# Patient Record
Sex: FEMALE
[De-identification: names, ages, dates, MRNs, and addresses within clinical notes are randomized per-mention and may not be internally consistent; named-entity substitution may affect disease eponyms.]

---

## 2019-08-23 ENCOUNTER — HOSPITAL ENCOUNTER (OUTPATIENT)
Dept: HOSPITAL 5 - CATHLABREC | Age: 64
Setting detail: OBSERVATION
LOS: 1 days | Discharge: HOME | End: 2019-08-24
Attending: INTERNAL MEDICINE | Admitting: INTERNAL MEDICINE
Payer: COMMERCIAL

## 2019-08-23 DIAGNOSIS — I25.10: Primary | ICD-10-CM

## 2019-08-23 LAB
APTT BLD: 27.9 SEC. (ref 24.2–36.6)
BASOPHILS # (AUTO): 0.1 K/MM3 (ref 0–0.1)
BASOPHILS NFR BLD AUTO: 0.7 % (ref 0–1.8)
BUN SERPL-MCNC: 19 MG/DL (ref 7–17)
BUN/CREAT SERPL: 27 %
CALCIUM SERPL-MCNC: 9.2 MG/DL (ref 8.4–10.2)
EOSINOPHIL # BLD AUTO: 0.2 K/MM3 (ref 0–0.4)
EOSINOPHIL NFR BLD AUTO: 1.5 % (ref 0–4.3)
HCT VFR BLD CALC: 38.4 % (ref 30.3–42.9)
HEMOLYSIS INDEX: 69
HGB BLD-MCNC: 12.9 GM/DL (ref 10.1–14.3)
INR PPP: 1.1 (ref 0.87–1.13)
LYMPHOCYTES # BLD AUTO: 2.7 K/MM3 (ref 1.2–5.4)
LYMPHOCYTES NFR BLD AUTO: 23.6 % (ref 13.4–35)
MCHC RBC AUTO-ENTMCNC: 34 % (ref 30–34)
MCV RBC AUTO: 91 FL (ref 79–97)
MONOCYTES # (AUTO): 0.8 K/MM3 (ref 0–0.8)
MONOCYTES % (AUTO): 6.8 % (ref 0–7.3)
PLATELET # BLD: 257 K/MM3 (ref 140–440)
RBC # BLD AUTO: 4.2 M/MM3 (ref 3.65–5.03)

## 2019-08-23 PROCEDURE — 92928 PRQ TCAT PLMT NTRAC ST 1 LES: CPT

## 2019-08-23 PROCEDURE — 93458 L HRT ARTERY/VENTRICLE ANGIO: CPT

## 2019-08-23 PROCEDURE — 82553 CREATINE MB FRACTION: CPT

## 2019-08-23 PROCEDURE — C1894 INTRO/SHEATH, NON-LASER: HCPCS

## 2019-08-23 PROCEDURE — 85730 THROMBOPLASTIN TIME PARTIAL: CPT

## 2019-08-23 PROCEDURE — 36415 COLL VENOUS BLD VENIPUNCTURE: CPT

## 2019-08-23 PROCEDURE — 93010 ELECTROCARDIOGRAM REPORT: CPT

## 2019-08-23 PROCEDURE — C1887 CATHETER, GUIDING: HCPCS

## 2019-08-23 PROCEDURE — 92978 ENDOLUMINL IVUS OCT C 1ST: CPT

## 2019-08-23 PROCEDURE — 80048 BASIC METABOLIC PNL TOTAL CA: CPT

## 2019-08-23 PROCEDURE — C1769 GUIDE WIRE: HCPCS

## 2019-08-23 PROCEDURE — 82550 ASSAY OF CK (CPK): CPT

## 2019-08-23 PROCEDURE — 82962 GLUCOSE BLOOD TEST: CPT

## 2019-08-23 PROCEDURE — 85610 PROTHROMBIN TIME: CPT

## 2019-08-23 PROCEDURE — 71045 X-RAY EXAM CHEST 1 VIEW: CPT

## 2019-08-23 PROCEDURE — 85025 COMPLETE CBC W/AUTO DIFF WBC: CPT

## 2019-08-23 PROCEDURE — 85347 COAGULATION TIME ACTIVATED: CPT

## 2019-08-23 PROCEDURE — 80061 LIPID PANEL: CPT

## 2019-08-23 PROCEDURE — C1753 CATH, INTRAVAS ULTRASOUND: HCPCS

## 2019-08-23 PROCEDURE — C9600 PERC DRUG-EL COR STENT SING: HCPCS

## 2019-08-23 PROCEDURE — C1874 STENT, COATED/COV W/DEL SYS: HCPCS

## 2019-08-23 PROCEDURE — G0378 HOSPITAL OBSERVATION PER HR: HCPCS

## 2019-08-23 PROCEDURE — 84484 ASSAY OF TROPONIN QUANT: CPT

## 2019-08-23 PROCEDURE — 93005 ELECTROCARDIOGRAM TRACING: CPT

## 2019-08-23 RX ADMIN — SODIUM CHLORIDE SCH MLS/HR: 0.9 INJECTION, SOLUTION INTRAVENOUS at 07:13

## 2019-08-23 RX ADMIN — SODIUM CHLORIDE SCH MLS: 0.9 INJECTION, SOLUTION INTRAVENOUS at 09:52

## 2019-08-23 RX ADMIN — HEPARIN SODIUM ONE UNIT: 1000 INJECTION, SOLUTION INTRAVENOUS; SUBCUTANEOUS at 10:06

## 2019-08-23 RX ADMIN — HEPARIN SODIUM ONE UNIT: 1000 INJECTION, SOLUTION INTRAVENOUS; SUBCUTANEOUS at 09:52

## 2019-08-23 RX ADMIN — HEPARIN SODIUM ONE UNIT: 1000 INJECTION, SOLUTION INTRAVENOUS; SUBCUTANEOUS at 10:30

## 2019-08-23 NOTE — CARDIAC CATHERIZATION REPORT
REFERRING PHYSICIAN:  Dr. Lux Alcantara.



INDICATION FOR PROCEDURE:  The patient is a pleasant 64-year-old 

female having recurrent chest pain, abnormal nuclear stress test, on multiple

antianginals, referred for left heart catheterization.  She has known peripheral

vascular disease as well.  Risks, benefits, alternatives discussed at length

prior to obtaining informed consent.



PROCEDURE IN DETAIL:  The patient was brought to the cath lab in a

postabsorptive state, was prepped and draped in sterile fashion.  Humphrey's test

in right hand was normal.  A 2 mL of 2% lidocaine used to anesthetize the right

wrist.  A standard 6-[place] hydrophilic sheath used to cannulate the right

radial artery via modified Seldinger technique.  All exchanges performed to

exchange a J-tip guidewire.  A JL3.5 catheter used to engage the left main.  No

dampening or ventricularization.  Cineangiography performed in multiple

projections.  JR4 catheter used to cross the aortic valve on fluoroscopic

guidance.  Left ventriculography in 30 JUNG and 30 KEVIN projections via hand

injections, catheter flushed.  Manual pullback performed with continuous

pressure monitoring.  Catheter used to engage the right coronary.  No dampening

or ventricularization.  Cineangiography performed in all projections.



DATA:  Aortic pressure is 120/80.  LV pressure is 120, LVEDP of 12 mmHg.  She

remained in normal sinus rhythm throughout the procedure.  Left ventriculography

reveals normal systolic performance.  Estimated ejection fraction of 55-60%.  No

evidence of aortic stenosis.



CORONARY ANATOMY:  This is a codominant system.  Left main is a short vessel,

bifurcates into left anterior descending and left circumflex.  Her overall

vasculature is quite small for her size, likely due to long-term tobacco abuse. 

Left circumflex with an ulcerated 90% stenosis in the mid segment.  This is the

culprit lesion.  There are extensive left to right collaterals identified.  LAD

is a moderate sized vessel, courses the anterior interventricular groove, wraps

around the apex, scattered luminal irregularities in the small LAD, but no

obstructive lesions identified in the LAD or diagonal vessels.  The right

coronary is very small and chronically occluded in the mid segment.  Again, left

to right collaterals were identified.



Given unstable angina, abnormal stress test with lateral ischemia, chest pain,

multiple antianginals, decided to proceed with PCI.  Heparin given.  EBU 3.0

guide was used to engage left main without difficulty, used a [place] wire to

cross the lesion, used a 2.5 x 18 Jose Antonio drug-eluting stent deployed at 12 LIEN for

30 seconds, excellent angiographic result.  Intravascular ultrasound was

performed, which reveals a well-opposed, well-expanded stent.  No complications.

 Final angiogram reveals excellent result.  I directly supervised the

administration of moderate sedation from 10:10 a.m.-10:45 a.m. with fentanyl and

Versed.



CONCLUSIONS:

1.  Severe 2-vessel disease.

a.  Ulcerated 90% mid left circumflex stenosis, which is the culprit lesion

consistent with her symptoms and abnormal stress test.

b.  Successful IVUS guided PCI with placement of drug-eluting stent (Jose Antonio 2.5 x

18 mm) with excellent final angiographic and ultrasonographic results.

c.  Chronic total occlusion of a very small mid right coronary with left to

right collaterals.  Medical management.

3.  Short left main and LAD without significant disease.

4.  Preserved left ventricular systolic performance, estimated ejection fraction

of 55-60%.

5.  No evidence of aortic stenosis.



Aspirin, Plavix, statin.  I discussed smoking cessation at length for 5 minutes,

including techniques; aggressive primary and secondary prevention measures;

standard radial care.  Results of procedure explained to the patient at length. 

All questions were addressed.  Anticipate discharge tomorrow.





DD: 08/23/2019 11:35

DT: 08/23/2019 12:35

JOB# [id number]  [id number]

SBM/NTS

## 2019-08-24 VITALS — SYSTOLIC BLOOD PRESSURE: 103 MMHG | DIASTOLIC BLOOD PRESSURE: 47 MMHG

## 2019-08-24 LAB
BASOPHILS # (AUTO): 0 K/MM3 (ref 0–0.1)
BASOPHILS NFR BLD AUTO: 0.4 % (ref 0–1.8)
BUN SERPL-MCNC: 9 MG/DL (ref 7–17)
BUN/CREAT SERPL: 18 %
CALCIUM SERPL-MCNC: 8.9 MG/DL (ref 8.4–10.2)
EOSINOPHIL # BLD AUTO: 0.1 K/MM3 (ref 0–0.4)
EOSINOPHIL NFR BLD AUTO: 1.2 % (ref 0–4.3)
HCT VFR BLD CALC: 38 % (ref 30.3–42.9)
HDLC SERPL-MCNC: 30 MG/DL (ref 40–59)
HEMOLYSIS INDEX: 0
HGB BLD-MCNC: 13.1 GM/DL (ref 10.1–14.3)
LYMPHOCYTES # BLD AUTO: 1.8 K/MM3 (ref 1.2–5.4)
LYMPHOCYTES NFR BLD AUTO: 19.6 % (ref 13.4–35)
MCHC RBC AUTO-ENTMCNC: 34 % (ref 30–34)
MCV RBC AUTO: 90 FL (ref 79–97)
MONOCYTES # (AUTO): 0.7 K/MM3 (ref 0–0.8)
MONOCYTES % (AUTO): 7.5 % (ref 0–7.3)
PLATELET # BLD: 218 K/MM3 (ref 140–440)
RBC # BLD AUTO: 4.2 M/MM3 (ref 3.65–5.03)

## 2019-08-24 NOTE — DISCHARGE SUMMARY
Providers





- Providers


Date of Admission: 


08/23/19 12:02





Attending physician: 


DOE GONSALVES





                                        





08/23/19


Consult to Cardiac Rehabilitation [CONS] Routine 


   Reason For Exam: post pci














Hospitalization


Condition: Good


Disposition: DC-01 TO HOME OR SELFCARE





- Discharge Diagnoses


(1) CAD (coronary artery disease)


Status: Acute   





(2) Stented coronary artery


Status: Acute   





Core Measure Documentation





- Palliative Care


Palliative Care/ Comfort Measures: Not Applicable





- Core Measures


Any of the following diagnoses?: none





- VTE Discharge Requirements


Deep Vein Thrombosis/Pulmonary Embolism Present on Admission: No


Has pt received <5 days of overlap therapy or INR<2.0: No


Contraindication No Overlap Therapy order at DC: Not Indicated





- Heart Failure Discharge Requirements


ACE/ARB for LVSD if EF <40%: Not Applicable





- Stroke Discharge Requirements


Statin for LDL = or >70 mg/dl on DC: Not Applicable





Exam





- Constitutional


Vitals: 


                                        











Temp Pulse Resp BP Pulse Ox


 


 98.3 F   73   18   103/47   98 


 


 08/24/19 07:28  08/24/19 10:00  08/24/19 07:28  08/24/19 07:28  08/24/19 07:28











General appearance: Present: no acute distress





- EENT


Eyes: Present: PERRL


ENT: hearing intact





- Neck


Neck: Present: supple





- Respiratory


Respiratory effort: normal


Respiratory: bilateral: CTA





- Cardiovascular


Rhythm: regular


Heart Sounds: Present: S1 & S2





- Extremities


Extremities: no ischemia, No edema


Extremity abnormal: other (right radial site bruised.  No hematoma or edema. )


Peripheral Pulses: within normal limits





- Abdominal


General gastrointestinal: Present: deferred


Female genitourinary: Present: deferred





Plan


Activity: no restrictions


Weight Bearing Status: Full Weight Bearing


Diet: low fat, low cholesterol, low salt


Wound: open to air


Special Instructions: smoking cessation, other (Do not take metformin on 8/24 

and 8/25.  Resume metformin on 8/26)


Follow up with: 


HOLGER MELENDEZ NP [Other] - 7 Days


ILAN MENON MD [Staff Physician] - 7 Days


Prescriptions: 


Clopidogrel [Plavix] 75 mg PO QDAY 30 Days  tablet

## 2019-08-24 NOTE — XRAY REPORT
CHEST 1 VIEW 8/24/2019 7:57 AM



INDICATION / CLINICAL INFORMATION:

post pci.



COMPARISON: 

None available.



FINDINGS:



SUPPORT DEVICES: None.

HEART / MEDIASTINUM: Normal cardiac size and mediastinal contours with mild aortic atherosclerosis. 

LUNGS / PLEURA: No significant pulmonary or pleural abnormality. No pneumothorax. 



ADDITIONAL FINDINGS: No significant additional findings.



IMPRESSION:

No acute abnormality of the chest.



Signer Name: Erich Lee MD 

Signed: 8/24/2019 8:06 AM

 Workstation Name: [id number]

## 2019-08-26 ENCOUNTER — HOSPITAL ENCOUNTER (INPATIENT)
Dept: HOSPITAL 5 - ED | Age: 64
LOS: 2 days | Discharge: HOME HEALTH | DRG: 65 | End: 2019-08-28
Attending: INTERNAL MEDICINE | Admitting: INTERNAL MEDICINE
Payer: COMMERCIAL

## 2019-08-26 DIAGNOSIS — G20: ICD-10-CM

## 2019-08-26 DIAGNOSIS — R41.4: ICD-10-CM

## 2019-08-26 DIAGNOSIS — I25.10: ICD-10-CM

## 2019-08-26 DIAGNOSIS — E11.9: ICD-10-CM

## 2019-08-26 DIAGNOSIS — I10: ICD-10-CM

## 2019-08-26 DIAGNOSIS — Z79.84: ICD-10-CM

## 2019-08-26 DIAGNOSIS — R29.702: ICD-10-CM

## 2019-08-26 DIAGNOSIS — Z95.5: ICD-10-CM

## 2019-08-26 DIAGNOSIS — G81.94: ICD-10-CM

## 2019-08-26 DIAGNOSIS — F17.200: ICD-10-CM

## 2019-08-26 DIAGNOSIS — R47.81: ICD-10-CM

## 2019-08-26 DIAGNOSIS — R47.1: ICD-10-CM

## 2019-08-26 DIAGNOSIS — J45.909: ICD-10-CM

## 2019-08-26 DIAGNOSIS — I63.9: Primary | ICD-10-CM

## 2019-08-26 DIAGNOSIS — I65.22: ICD-10-CM

## 2019-08-26 LAB
APTT BLD: 21 SEC. (ref 24.2–36.6)
BASOPHILS # (AUTO): 0.1 K/MM3 (ref 0–0.1)
BASOPHILS NFR BLD AUTO: 0.8 % (ref 0–1.8)
BUN SERPL-MCNC: 16 MG/DL (ref 7–17)
BUN/CREAT SERPL: 20 %
CALCIUM SERPL-MCNC: 9 MG/DL (ref 8.4–10.2)
EOSINOPHIL # BLD AUTO: 0.1 K/MM3 (ref 0–0.4)
EOSINOPHIL NFR BLD AUTO: 1.7 % (ref 0–4.3)
HCT VFR BLD CALC: 36.1 % (ref 30.3–42.9)
HEMOLYSIS INDEX: 18
HGB BLD-MCNC: 12.1 GM/DL (ref 10.1–14.3)
INR PPP: 1.12 (ref 0.87–1.13)
LYMPHOCYTES # BLD AUTO: 2.3 K/MM3 (ref 1.2–5.4)
LYMPHOCYTES NFR BLD AUTO: 27.1 % (ref 13.4–35)
MCHC RBC AUTO-ENTMCNC: 34 % (ref 30–34)
MCV RBC AUTO: 91 FL (ref 79–97)
MONOCYTES # (AUTO): 0.6 K/MM3 (ref 0–0.8)
MONOCYTES % (AUTO): 7.6 % (ref 0–7.3)
PLATELET # BLD: 211 K/MM3 (ref 140–440)
RBC # BLD AUTO: 3.95 M/MM3 (ref 3.65–5.03)

## 2019-08-26 PROCEDURE — 70450 CT HEAD/BRAIN W/O DYE: CPT

## 2019-08-26 PROCEDURE — 84484 ASSAY OF TROPONIN QUANT: CPT

## 2019-08-26 PROCEDURE — 85610 PROTHROMBIN TIME: CPT

## 2019-08-26 PROCEDURE — 85730 THROMBOPLASTIN TIME PARTIAL: CPT

## 2019-08-26 PROCEDURE — 70498 CT ANGIOGRAPHY NECK: CPT

## 2019-08-26 PROCEDURE — 93010 ELECTROCARDIOGRAM REPORT: CPT

## 2019-08-26 PROCEDURE — 70544 MR ANGIOGRAPHY HEAD W/O DYE: CPT

## 2019-08-26 PROCEDURE — 70496 CT ANGIOGRAPHY HEAD: CPT

## 2019-08-26 PROCEDURE — 82962 GLUCOSE BLOOD TEST: CPT

## 2019-08-26 PROCEDURE — 93005 ELECTROCARDIOGRAM TRACING: CPT

## 2019-08-26 PROCEDURE — 93306 TTE W/DOPPLER COMPLETE: CPT

## 2019-08-26 PROCEDURE — 80048 BASIC METABOLIC PNL TOTAL CA: CPT

## 2019-08-26 PROCEDURE — 80061 LIPID PANEL: CPT

## 2019-08-26 PROCEDURE — 85025 COMPLETE CBC W/AUTO DIFF WBC: CPT

## 2019-08-26 PROCEDURE — 93880 EXTRACRANIAL BILAT STUDY: CPT

## 2019-08-26 PROCEDURE — 70551 MRI BRAIN STEM W/O DYE: CPT

## 2019-08-26 PROCEDURE — 85670 THROMBIN TIME PLASMA: CPT

## 2019-08-26 PROCEDURE — 83036 HEMOGLOBIN GLYCOSYLATED A1C: CPT

## 2019-08-26 PROCEDURE — 99406 BEHAV CHNG SMOKING 3-10 MIN: CPT

## 2019-08-26 PROCEDURE — 80053 COMPREHEN METABOLIC PANEL: CPT

## 2019-08-26 PROCEDURE — 36415 COLL VENOUS BLD VENIPUNCTURE: CPT

## 2019-08-26 RX ADMIN — GLIPIZIDE SCH MG: 10 TABLET ORAL at 23:48

## 2019-08-26 RX ADMIN — ASPIRIN SCH MG: 81 TABLET, CHEWABLE ORAL at 23:19

## 2019-08-26 RX ADMIN — Medication SCH ML: at 23:19

## 2019-08-26 RX ADMIN — METFORMIN HYDROCHLORIDE SCH: 850 TABLET ORAL at 23:49

## 2019-08-26 RX ADMIN — LISINOPRIL SCH: 5 TABLET ORAL at 23:49

## 2019-08-26 RX ADMIN — ESCITALOPRAM OXALATE SCH MG: 10 TABLET ORAL at 23:48

## 2019-08-26 RX ADMIN — FAMOTIDINE SCH MG: 20 TABLET ORAL at 23:19

## 2019-08-26 NOTE — CAT SCAN REPORT
CT of the head without contrast



INDICATION:

neuro deficits <6hrs or sx present upon awakening.



TECHNIQUE: Routine CT head without contrast. All CT scans at this location are performed using CT dos
e reduction for ALARA by means of automated exposure control.



COMPARISON: 

None.



FINDINGS:



BRAIN / INTRACRANIAL CONTENTS: No acute hemorrhage, mass effect, midline shift, or hydrocephalus. No 
appreciable acute large territorial or lacunar infarct. There is a small chronic-appearing cortical i
nfarct in the left occipital lobe in the left PCA territory as well as a small chronic appearing lacu
holden infarction right caudate head. There is a commensurate mild ventricular and cisternal prominence.




ORBITS: No significant abnormality of visualized orbits.

SINUSES / MASTOIDS: No significant abnormality of visualized sinuses and mastoid air cells.



ADDITIONAL FINDINGS: None. 



IMPRESSION:

1. No definite acute findings.

2. Small chronic-appearing cortical infarct in the left occipital lobe and small chronic appearing la
cune in the right caudate head. 



Signer Name: Huy Hernández MD 

Signed: 8/26/2019 11:49 AM

 Workstation Name: [id number]

## 2019-08-26 NOTE — CAT SCAN REPORT
CTA head with and without IV contrast.



CLINICAL HISTORY: Cerebrovascular accident.



Technique: Multiple contiguous postcontrast CT images of the head were obtained at 0.63 mm intervals.
3 plane MIP reconstructions were obtained. Precontrast localizing images were also performed. CT scan
s at this location are performed using the CT dose reduction for [employer] by means of automated exposure
 control.



FINDINGS: There is atherosclerotic calcification involving the distal internal carotid arteries with 
mild to moderate segmental narrowing at, most notably involving the anterior genu used. There is no s
ignificant stenosis of the vertebrobasilar system.



The motion degrades image quality at. Furthermore, there appears to be diffuse irregularity of the in
tracranial vessels which may reflect atherosclerotic disease. There is also a focus of calcification 
involving the M1 segment of the right MCA with associated at. However, contrast opacification is seen
 distal to this region. Otherwise, the proximal cerebral branches appear unremarkable.



There is no definitive CTA evidence of intracranial aneurysm. The dural venous sinuses opacify with c
ontrast.



IMPRESSION:



There is atherosclerotic calcification involving the distal internal carotid arteries with mild to mo
derate segmental narrowing as described.



Signer Name: Alexys Whitten MD 

Signed: 8/26/2019 5:26 PM

 Workstation Name: [id number]

## 2019-08-26 NOTE — EMERGENCY DEPARTMENT REPORT
ED General Adult HPI





- General


Chief complaint: Neck Pain/Injury


Stated complaint: POSS STROKE


Time Seen by Provider: 08/26/19 11:20


Source: patient


Mode of arrival: Wheelchair


Limitations: No Limitations





- History of Present Illness


-: Gradual





- Related Data


                                Home Medications











 Medication  Instructions  Recorded  Confirmed  Last Taken


 


Albuterol Sulfate [Proventil Hfa] 6.7 gm IH PRN PRN 08/23/19 08/23/19 Unknown


 


AtorvaSTATin [Lipitor] 40 mg PO QHS 08/23/19 08/23/19 08/22/19


 


Clopidogrel [Plavix] 75 mg PO DAILY 08/23/19 08/23/19 08/22/19


 


Dulaglutide [Trulicity] 0.75 mg SQ QWEEK 08/23/19 08/23/19 08/20/19


 


Escitalopram [Lexapro] 10 mg PO DAILY 08/23/19 08/23/19 08/23/19


 


Lisinopril [Zestril TAB] 5 mg PO DAILY 08/23/19 08/23/19 08/22/19


 


glipiZIDE [Glucotrol] 10 mg PO QID 08/23/19 08/23/19 08/22/19


 


metFORMIN [Glucophage] 850 mg PO BID 08/23/19 08/23/19 08/22/19








                                  Previous Rx's











 Medication  Instructions  Recorded  Last Taken  Type


 


Aspirin [Aspirin BABY CHEW TAB] 162 mg PO QDAY  tab.chew 08/24/19 Unknown Rx


 


Clopidogrel [Plavix] 75 mg PO QDAY 30 Days  tablet 08/24/19 Unknown Rx











                                    Allergies











Allergy/AdvReac Type Severity Reaction Status Date / Time


 


No Known Allergies Allergy   Unverified 08/23/19 06:08














ED Review of Systems


ROS: 


Stated complaint: POSS STROKE


Other details as noted in HPI








ED Past Medical Hx





- Past Medical History


Hx Hypertension: Yes


Hx Heart Attack/AMI: No


Hx Diabetes: Yes


Hx Arthritis: Yes


Hx Asthma: Yes (inhaler prn)


Hx HIV: No





- Surgical History


Hx Coronary Stent: Yes





- Social History


Smoking Status: Current Every Day Smoker


Substance Use Type: None





- Medications


Home Medications: 


                                Home Medications











 Medication  Instructions  Recorded  Confirmed  Last Taken  Type


 


Albuterol Sulfate [Proventil Hfa] 6.7 gm IH PRN PRN 08/23/19 08/23/19 Unknown 

History


 


AtorvaSTATin [Lipitor] 40 mg PO QHS 08/23/19 08/23/19 08/22/19 History


 


Clopidogrel [Plavix] 75 mg PO DAILY 08/23/19 08/23/19 08/22/19 History


 


Dulaglutide [Trulicity] 0.75 mg SQ QWEEK 08/23/19 08/23/19 08/20/19 History


 


Escitalopram [Lexapro] 10 mg PO DAILY 08/23/19 08/23/19 08/23/19 History


 


Lisinopril [Zestril TAB] 5 mg PO DAILY 08/23/19 08/23/19 08/22/19 History


 


glipiZIDE [Glucotrol] 10 mg PO QID 08/23/19 08/23/19 08/22/19 History


 


metFORMIN [Glucophage] 850 mg PO BID 08/23/19 08/23/19 08/22/19 History


 


Aspirin [Aspirin BABY CHEW TAB] 162 mg PO QDAY  tab.chew 08/24/19  Unknown Rx


 


Clopidogrel [Plavix] 75 mg PO QDAY 30 Days  tablet 08/24/19  Unknown Rx














ED Physical Exam





- General


Limitations: No Limitations


General appearance: lethargic





- Head


Head exam: Present: atraumatic, normocephalic





- Eye


Eye exam: Present: normal appearance, PERRL, EOMI





- ENT


ENT exam: Present: normal exam





- Neck


Neck exam: Present: normal inspection





- Respiratory


Respiratory exam: Present: normal lung sounds bilaterally





- Cardiovascular


Cardiovascular Exam: Present: regular rate





- GI/Abdominal


GI/Abdominal exam: Present: soft, normal bowel sounds





ED Course


                                   Vital Signs











  08/26/19 08/26/19





  10:52 11:30


 


Temperature 98.5 F 


 


Pulse Rate 83 78


 


Respiratory 20 21





Rate  


 


Blood Pressure 112/39 123/36


 


O2 Sat by Pulse 98 96





Oximetry  














ED Medical Decision Making





- EKG Data


-: EKG Interpreted by Me


EKG shows normal: sinus rhythm


Rate: normal





- EKG Data


When compared to previous EKG there are: no significant change





08/26/19 12:08


sinus rhythm, 74





- Radiology Data


Radiology results: report reviewed





- Medical Decision Making





patient with stroke symptoms nih 2, but symptoms are two days old, therefore 

patient is out of the TPa window.


Critical care attestation.: 


If time is entered above; I have spent that time in minutes in the direct care 

of this critically ill patient, excluding procedure time.








ED Disposition


Clinical Impression: 


 Stroke





Disposition: DC-09 OP ADMIT IP TO THIS HOSP


Condition: Stable

## 2019-08-26 NOTE — CAT SCAN REPORT
CTA neck with and without contrast



CLINICAL HISTORY: Cerebrovascular accident.



Technique: Multiple contiguous postcontrast axial CT images of the neck were obtained at 0.63 mm inte
rvals. 3 plane MIP reconstructions were produced. Precontrast localizing images were also performed. 
All CT scans at this location are performed using the CT dose reduction for ALARA by means of automat
ed exposure control.



FINDINGS: No previous exams available for comparison. There is calcified atherosclerotic plaque invol
ving the left carotid bifurcation with approximately 70% stenosis by NASCET criteria. Scattered plaqu
e is also seen along the extent of the left common carotid artery with mild segmental narrowing. The 
mild narrowing also appears to include the origin of the left common carotid artery.



There is also calcified plaque involving the right carotid bifurcation with mild, 30-40% stenosis at.
 There is marked stenosis involving origin of the right external carotid artery at. Is mild plaque in
volving the right common carotid artery without significant narrowing.



There appears be mild narrowing involving the origin of the left vertebral artery. Otherwise, this ve
ssels are unremarkable. The proximal right vertebral artery is obscured by the dense contrast within 
the overlying venous structures at. However, there also appears to be a calcified plaque at the origi
n with mild narrowing at. The more distal right vertebral artery is unremarkable. There is notable ca
lcification involving the aortic arch. There is notable irregular plaque extending into the proximal 
left subclavian artery with mild ectasia and stenosis.



IMPRESSION:



There is calcified plaque involving left carotid bifurcation with approximately 70% stenosis by NASCE
T criteria.

2. This also calcified plaque involving right carotid bifurcation with 30-40% stenosis at. There is m
arked narrowing of the origin of the right external carotid artery.

3. There is mild narrowing of the origins of the vertebral arteries.



Signer Name: Alexys Whitten MD 

Signed: 8/26/2019 5:19 PM

 Workstation Name: [id number]

## 2019-08-26 NOTE — EMERGENCY DEPARTMENT REPORT
ED Neuro Deficit HPI





- General


Chief Complaint: Neck Pain/Injury


Stated Complaint: POSS STROKE


Source: patient


Mode of arrival: Wheelchair


Limitations: No Limitations





- History of Present Illness


Initial Comments: 











[facility]


Date of service: 8/26/2019





Impression: 64 year old female who presented to the ED because of 2 days of left

side weakness and neglect as well as left visual field abnormality. Symptoms 

consistent with right posterior stroke. 





   


Not a tpa candidate due to: LSN more than 4.5 hours prior arrival


Does not meet LVO screening criteria (no aphasia, neglect, gaze deviation, dense

hemiparesis, or visual field deficits on exam), therefore advanced imaging is 

not indicated. 





Comments:


Door Time: 10:40


[facility] contacted: 11:05


[facility] at bedside:11:10 


NIHSS assessment start time (time the consultation begins): 11:20


Last known well time (LKW): 8/24/2019





Recommendations: 


Start antiplatelet if no obvious contraindication


Stroke protocol admission/ orderset suggested with placement on stroke floor


tele monitoring


Bedside swallow evaluation


HOB less than 30 degrees


IV Fluid hydration with NS


Euglycemia


avoid hyperthermia, PRN acetaminophen


dvt ppx





Consider inpatient neurology consultation


Discussed with ED MD


Please call with questions





 

--------------------------------------------------------------------------------


---------





CC: Stroke alert





History of Present Illness 





Patient is a64 year old female who presented to the hospital with 2 days of 

left side weakness and visual field defect. Patient woke up on Sunday and was 

unable to lift her left arm and was not speaking properly. She improved slightly

but symptoms still persisted. Patient recently had a heart cath and had stent 

placed on 8/23. 





Diagnostic:


CT Brain w/o contrast: multiple small hypodensities in the right parietal and 

left occipital lobe -- unclear if old or new. 





Exam:


Mental Status:


Awake, alert, oriented


Naming: Intact


Repetition: Intact 


Speech: fluent





Cranial Nerves:


Pupils: Equal round and reactive to light


Extraocular movements: Intact in all cardinal gaze


Ptosis: Absent


Visual fields: left lower visual field defect. 


Facial sensation: Intact to pin and light touch


Facial movements: Intact and symmetric 





Motor Exam:


left leg weakness   





Sensory Exam: 


Light touch: Intact


Pinprick: Intact





Coordination: 


Finger to nose: Intact


Heel to shin: Intact





NIHSS score: 2








Medical Decision Making: 


- Extensive number of diagnosis or management options are considered above.


- Extensive amount of complex data reviewed.


- High risk of complication and/or morbidity or mortality are associated with 

differential diagnostic considerations above. 


- There may be Uncertain outcome and increased probability of prolonged 

functional impairment or high probability of severe prolonged functional 

impairment associated with some of these differential diagnosis. 


Medical Data Reviewed: 


1.Data reviewed include clinical labs, radiology,Medical Tests;


2.Tests results discussed w/performing or interpreting physician;


3.Obtaining/reviewing old medical records; 


4.Obtaining case history from another source; 


5.Independent review of image, tracing or specimen. 





Patient was informed the Neurology Consult would happen via TeleHealth consult 

by way of interactive audio and video telecommunications and consented to 

receiving care in this manner.    








- Related Data


Home Medications: 


                                Home Medications











 Medication  Instructions  Recorded  Confirmed  Last Taken


 


Albuterol Sulfate [Proventil Hfa] 6.7 gm IH PRN PRN 08/23/19 08/23/19 Unknown


 


AtorvaSTATin [Lipitor] 40 mg PO QHS 08/23/19 08/23/19 08/22/19


 


Clopidogrel [Plavix] 75 mg PO DAILY 08/23/19 08/23/19 08/22/19


 


Dulaglutide [Trulicity] 0.75 mg SQ QWEEK 08/23/19 08/23/19 08/20/19


 


Escitalopram [Lexapro] 10 mg PO DAILY 08/23/19 08/23/19 08/23/19


 


Lisinopril [Zestril TAB] 5 mg PO DAILY 08/23/19 08/23/19 08/22/19


 


glipiZIDE [Glucotrol] 10 mg PO QID 08/23/19 08/23/19 08/22/19


 


metFORMIN [Glucophage] 850 mg PO BID 08/23/19 08/23/19 08/22/19








                                  Previous Rx's











 Medication  Instructions  Recorded  Last Taken  Type


 


Aspirin [Aspirin BABY CHEW TAB] 162 mg PO QDAY  tab.chew 08/24/19 Unknown Rx


 


Clopidogrel [Plavix] 75 mg PO QDAY 30 Days  tablet 08/24/19 Unknown Rx











Allergies/Adverse Reactions: 


                                    Allergies











Allergy/AdvReac Type Severity Reaction Status Date / Time


 


No Known Allergies Allergy   Unverified 08/23/19 06:08














ED Review of Systems


ROS: 


Stated complaint: POSS STROKE


Other details as noted in HPI








ED Past Medical Hx





- Past Medical History


Hx Hypertension: Yes


Hx Heart Attack/AMI: No


Hx Diabetes: Yes


Hx Arthritis: Yes


Hx Asthma: Yes (inhaler prn)


Hx HIV: No





- Surgical History


Hx Coronary Stent: Yes





- Social History


Smoking Status: Current Every Day Smoker


Substance Use Type: None





- Medications


Home Medications: 


                                Home Medications











 Medication  Instructions  Recorded  Confirmed  Last Taken  Type


 


Albuterol Sulfate [Proventil Hfa] 6.7 gm IH PRN PRN 08/23/19 08/23/19 Unknown 

History


 


AtorvaSTATin [Lipitor] 40 mg PO QHS 08/23/19 08/23/19 08/22/19 History


 


Clopidogrel [Plavix] 75 mg PO DAILY 08/23/19 08/23/19 08/22/19 History


 


Dulaglutide [Trulicity] 0.75 mg SQ QWEEK 08/23/19 08/23/19 08/20/19 History


 


Escitalopram [Lexapro] 10 mg PO DAILY 08/23/19 08/23/19 08/23/19 History


 


Lisinopril [Zestril TAB] 5 mg PO DAILY 08/23/19 08/23/19 08/22/19 History


 


glipiZIDE [Glucotrol] 10 mg PO QID 08/23/19 08/23/19 08/22/19 History


 


metFORMIN [Glucophage] 850 mg PO BID 08/23/19 08/23/19 08/22/19 History


 


Aspirin [Aspirin BABY CHEW TAB] 162 mg PO QDAY  tab.chew 08/24/19  Unknown Rx


 


Clopidogrel [Plavix] 75 mg PO QDAY 30 Days  tablet 08/24/19  Unknown Rx














ED Neuro Physical Exam





- General


Limitations: No Limitations


Suspected Stroke: Yes





- NIHSS


Assessment Interval: Baseline


1a. Level of Consciousness: alert/keenly responsive


1b. LOC Questions: answers both correctly


1c. LOC Commands: performs tasks correctly


2. Best Gaze: normal


3. Visual: partial hemianopia


4. Facial Palsy: normal symmetrical movement


5b. Motor Arm Right: no drift


5a. Motor Arm Left: no drift


6a. Motor Leg Left: drift


6b. Motor Leg Right: no drift


7. Limb Ataxia: absent


8. Sensory: normal


9. Best Language: no aphasia


10. Dysarthria: normal


11. Extinction/Inattention: no abnormality


Total Score: 2


Stroke Severity: Minor Stroke





ED Course


                                   Vital Signs











  08/26/19





  10:52


 


Temperature 98.5 F


 


Pulse Rate 83


 


Respiratory 20





Rate 


 


Blood Pressure 112/39


 


O2 Sat by Pulse 98





Oximetry 














- Lab Data


                                   Lab Results











  08/26/19 Range/Units





  11:04 


 


POC Glucose  377 H  ()  











Critical care attestation.: 


If time is entered above; I have spent that time in minutes in the direct care 

of this critically ill patient, excluding procedure time.








ED Disposition


Clinical Impression: 


Stroke


Qualifiers:


 CVA mechanism: unspecified Qualified Code(s): I63.9 - Cerebral infarction, 

unspecified





Disposition: DC-09 OP ADMIT IP TO THIS HOSP


Is pt being admited?: Yes


Does the pt Need Aspirin: Yes


Condition: Stable

## 2019-08-27 LAB
ALBUMIN SERPL-MCNC: 3.1 G/DL (ref 3.9–5)
ALT SERPL-CCNC: 16 UNITS/L (ref 7–56)
BASOPHILS # (AUTO): 0.1 K/MM3 (ref 0–0.1)
BASOPHILS NFR BLD AUTO: 0.7 % (ref 0–1.8)
BUN SERPL-MCNC: 13 MG/DL (ref 7–17)
BUN/CREAT SERPL: 22 %
CALCIUM SERPL-MCNC: 8.5 MG/DL (ref 8.4–10.2)
EOSINOPHIL # BLD AUTO: 0.2 K/MM3 (ref 0–0.4)
EOSINOPHIL NFR BLD AUTO: 1.9 % (ref 0–4.3)
HCT VFR BLD CALC: 34.8 % (ref 30.3–42.9)
HDLC SERPL-MCNC: 26 MG/DL (ref 40–59)
HEMOLYSIS INDEX: 1
HGB BLD-MCNC: 11.8 GM/DL (ref 10.1–14.3)
LYMPHOCYTES # BLD AUTO: 2.2 K/MM3 (ref 1.2–5.4)
LYMPHOCYTES NFR BLD AUTO: 26.8 % (ref 13.4–35)
MCHC RBC AUTO-ENTMCNC: 34 % (ref 30–34)
MCV RBC AUTO: 92 FL (ref 79–97)
MONOCYTES # (AUTO): 0.7 K/MM3 (ref 0–0.8)
MONOCYTES % (AUTO): 8.8 % (ref 0–7.3)
PLATELET # BLD: 194 K/MM3 (ref 140–440)
RBC # BLD AUTO: 3.77 M/MM3 (ref 3.65–5.03)

## 2019-08-27 RX ADMIN — METFORMIN HYDROCHLORIDE SCH: 850 TABLET ORAL at 08:06

## 2019-08-27 RX ADMIN — Medication SCH ML: at 12:44

## 2019-08-27 RX ADMIN — Medication SCH ML: at 22:43

## 2019-08-27 RX ADMIN — CLOPIDOGREL BISULFATE SCH MG: 75 TABLET ORAL at 16:44

## 2019-08-27 RX ADMIN — FAMOTIDINE SCH MG: 20 TABLET ORAL at 22:43

## 2019-08-27 RX ADMIN — METFORMIN HYDROCHLORIDE SCH: 850 TABLET ORAL at 17:58

## 2019-08-27 RX ADMIN — ASPIRIN SCH MG: 81 TABLET, CHEWABLE ORAL at 12:44

## 2019-08-27 RX ADMIN — FAMOTIDINE SCH MG: 20 TABLET ORAL at 12:43

## 2019-08-27 RX ADMIN — GLIPIZIDE SCH MG: 10 TABLET ORAL at 17:57

## 2019-08-27 RX ADMIN — LISINOPRIL SCH MG: 5 TABLET ORAL at 12:43

## 2019-08-27 RX ADMIN — ESCITALOPRAM OXALATE SCH MG: 10 TABLET ORAL at 12:44

## 2019-08-27 RX ADMIN — GLIPIZIDE SCH: 10 TABLET ORAL at 08:00

## 2019-08-27 NOTE — CONSULTATION
History of Present Illness


Consult date: 08/27/19


Reason for Consult: Stroke


Chief complaint: 





left sided weakness


History of present illness: 


Patient is a 63 y/o woman w/ a h/o HTN, DM, CAD, HLD, asthma, arthritis. She 

presented yesterday with a 2 day h/o slurred speech, left sided weakness, 

difficulty walking, and visual changes. Patient had a coronary stent placed on 

Friday, and was discharged Saturday. She reports that she was asymptomatic on 

Saturday. She went to [facility] on Sunday morning, and niece 

reports that she was sent home from the ER. She went home the same day after 

being discharged from [facility], and niece states that her symptoms persisted 

at that time. Patient was then brought to [facility] ER yesterday. She states that she

has been taking ASA and Plavix at home, and had not taken Plavix for about 2 

weeks, however began taking it once again 3 weeks ago. She also takes lipitor at

home, and has been compliant with this. 








Past History


Past Medical History: CAD, diabetes, hypertension, hyperlipidemia


Past Surgical History: PTCA, Other (Vscular surgery)


Social history: lives with family, smoking


Family history: denies: CAD





Medications and Allergies


                                    Allergies











Allergy/AdvReac Type Severity Reaction Status Date / Time


 


No Known Allergies Allergy   Unverified 08/23/19 06:08











                                Home Medications











 Medication  Instructions  Recorded  Confirmed  Last Taken  Type


 


Albuterol Sulfate [Proventil Hfa] 6.7 gm IH PRN PRN 08/23/19 08/26/19 08/26/19 

History


 


AtorvaSTATin [Lipitor] 40 mg PO QHS 08/23/19 08/26/19 08/25/19 History


 


Escitalopram [Lexapro] 10 mg PO DAILY 08/23/19 08/26/19 08/25/19 History


 


Lisinopril [Zestril TAB] 5 mg PO DAILY 08/23/19 08/26/19 08/25/19 History


 


glipiZIDE [Glucotrol] 10 mg PO QID 08/23/19 08/26/19 08/25/19 History


 


metFORMIN [Glucophage] 850 mg PO BID 08/23/19 08/26/19 08/25/19 History


 


Aspirin [Aspirin BABY CHEW TAB] 81 mg PO QDAY 08/26/19 08/26/19 08/25/19 History











Active Meds: 


Active Medications





Acetaminophen (Tylenol)  650 mg PO Q4H PRN


   PRN Reason: Pain MILD(1-3)/Fever >100.5/HA


Albuterol (Proventil)  2.5 mg IH Q4HRT PRN


   PRN Reason: Shortness Of Breath


Aspirin (Baby Aspirin)  81 mg PO QDAY [facility]


Atorvastatin Calcium (Lipitor)  40 mg PO QHS [facility]


   Last Admin: 08/26/19 23:19 Dose:  40 mg


   Documented by: 


Clopidogrel Bisulfate (Plavix)  75 mg PO QDAY [facility]


   Last Admin: 08/27/19 16:44 Dose:  75 mg


   Documented by: 


Dextrose (D50w (25gm) Syringe)  50 ml IV PRN PRN


   PRN Reason: Hypoglycemia


Escitalopram Oxalate (Lexapro)  10 mg PO DAILY [facility]


   Last Admin: 08/27/19 12:44 Dose:  10 mg


   Documented by: 


Famotidine (Pepcid)  20 mg PO BID [facility]


   Last Admin: 08/27/19 12:43 Dose:  20 mg


   Documented by: 


Glipizide (Glucotrol)  10 mg PO BIDDIAB [facility]


   Last Admin: 08/27/19 17:57 Dose:  10 mg


   Documented by: 


Insulin Human Lispro (Humalog)  0 unit SUB-Q ACHS [facility]; Protocol


   Last Admin: 08/27/19 16:44 Dose:  Not Given


   Documented by: 


Lisinopril (Zestril)  5 mg PO DAILY [facility]


   Last Admin: 08/27/19 12:43 Dose:  5 mg


   Documented by: 


Metformin HCl (Glucophage)  850 mg PO BIDDIAB [facility]


   Last Admin: 08/27/19 17:58 Dose:  Not Given


   Documented by: 


Ondansetron HCl (Zofran)  4 mg IV Q8H PRN


   PRN Reason: Nausea And Vomiting


Sodium Chloride (Sodium Chloride Flush Syringe 10 Ml)  10 ml IV BID [facility]


   Last Admin: 08/27/19 12:44 Dose:  10 ml


   Documented by: 


Sodium Chloride (Sodium Chloride Flush Syringe 10 Ml)  10 ml IV PRN PRN


   PRN Reason: LINE FLUSH











Review of Systems


All systems: negative


Neurological: change in speech, gait dysfunction, loss of vision





Physical Examination





- Vital Signs


Vital Signs: 


                                   Vital Signs











Temp Pulse Resp BP Pulse Ox


 


 98.5 F   83   20   112/39   98 


 


 08/26/19 10:52  08/26/19 10:52  08/26/19 10:52  08/26/19 10:52  08/26/19 10:52














- Constitutional


General appearance: comfortable





- EENT


EENT: Present: ATNC, PERRL, mucous membranes moist, hearing intact





- Respiratory


Respiratory: Present: lungs clear, normal breath sounds





- Cardiovascular


Cardiovascular: Present: regular rate, normal S1, normal S2


Extremities: Present: no peripheral edema bilatateraly, no clubbing, cyanosis, 

no inflammation





- Gastrointestinal


Gastrointestinal: Present: normoactive bowel sounds, soft, non-tender





- Integumentary


Integumentary: Present: normal





- Neurologic


Cranial nerve examination: PERRL, EOMI, V1/V2/V3 grossly intact, face symmetric,

tongue midline, intact shoulder shrug, other (left visual neglect)


Speech examination: intact


Motor examination - right side: 5/5: biceps, triceps, wrist flexion, wrist 

extension, , hip flexors, knee extensors, dorsiflexion, toe extension (EHL),

plantarflexion


Motor examination - left side: 4/5: biceps, triceps, wrist flexion, wrist 

extension, , 5/5: hip flexors, knee extensors, dorsiflexion, toe extension 

(EHL), plantarflexion


Detailed sensory examination: intact, light touch, extinction (on left)


Reflexes: 2+: ankle, bicep, knee, tricep


Cerebellar examination: other (b/l intact to HTS and FTN)





- Musculoskeletal


Musculoskeletal: Present: no fluid collection, no pain





- Psychiatric


Psychiatric: Present: mood/affect appropriate





- Level of Consciousness


1a. Level of Consciousness: alert/keenly responsive





- LOC Questions


1b. LOC Questions: answers both correctly





- LOC Command


1c. LOC Commands: performs tasks correctly





- Best Gaze


2. Best Gaze: normal





- Visual


3. Visual: no visual loss





- Facial Palsy


4. Facial Palsy: normal symmetrical movement





- Motor Arm


5a. Motor Arm Left: no drift


5b. Motor Arm Right: no drift





- Motor Leg


6a. Motor Leg Left: no drift


6b. Motor Leg Right: no drift





- Limb Ataxia


7. Limb Ataxia: absent





- Sensory


8. Sensory: normal





- Best Language


9. Best Language: no aphasia





- Dysarthria


10. Dysarthria: normal





- Extinction and Inattention


11. Extinction/Inattention: visual/tactile inattention





- Scoring


Total Score: 1


Stroke Severity: Minor Stroke





Results





- Laboratory Findings


CBC and BMP: 


                                 08/27/19 05:14





                                 08/27/19 05:14


Abnormal Lab Findings: 


                                  Abnormal Labs











  08/26/19 08/26/19 08/26/19





  11:04 11:31 11:31


 


Mono % (Auto)   7.6 H 


 


APTT    21.0 L


 


Thrombin Time   


 


Sodium   


 


Creatinine   


 


Glucose   


 


POC Glucose  377 H  


 


Hemoglobin A1c   


 


Troponin T   


 


Albumin   


 


HDL Cholesterol   














  08/26/19 08/26/19 08/26/19





  11:31 11:31 21:19


 


Mono % (Auto)   


 


APTT   


 


Thrombin Time   13.0 L 


 


Sodium  135 L  


 


Creatinine   


 


Glucose  379 H  


 


POC Glucose   


 


Hemoglobin A1c    7.8 H


 


Troponin T  0.154 H* D  


 


Albumin   


 


HDL Cholesterol   














  08/26/19 08/26/19 08/27/19





  21:19 23:44 05:14


 


Mono % (Auto)    8.8 H


 


APTT   


 


Thrombin Time   


 


Sodium   


 


Creatinine   


 


Glucose   


 


POC Glucose   190 H 


 


Hemoglobin A1c   


 


Troponin T  0.164 H*  


 


Albumin   


 


HDL Cholesterol   














  08/27/19 08/27/19 08/27/19





  05:14 05:14 07:39


 


Mono % (Auto)   


 


APTT   


 


Thrombin Time   


 


Sodium   


 


Creatinine  0.6 L  


 


Glucose  225 H  


 


POC Glucose    209 H


 


Hemoglobin A1c   


 


Troponin T   0.138 H* 


 


Albumin  3.1 L  


 


HDL Cholesterol  26 L  














  08/27/19 08/27/19





  11:52 17:24


 


Mono % (Auto)  


 


APTT  


 


Thrombin Time  


 


Sodium  


 


Creatinine  


 


Glucose  


 


POC Glucose  194 H  253 H


 


Hemoglobin A1c  


 


Troponin T  


 


Albumin  


 


HDL Cholesterol  














Assessment and Plan


Patient is a 63 y/o woman w/ a h/o HTN, DM, CAD, HLD, asthma, arthritis, who p/w

left sided weakness, slurred speech, and gait imbalance 2 days ago. According to

the patient's clinical findings, she has had an acute ischemic stroke involving 

the Rt. MCA territory. 





Plan:


1. Stroke:


- MRI revealed Rt. MCA territory embolic-appearing infarcts.


- CTA head/neck revealed Rt. ICA 30-40% stenosis, Lt. ICA 70% stenosis, distal 

b/l ICA mild stenosis.


- CUS revealed <50% stenosis ALISSA, 50-69% stenosis LICA.


- Echo: EF 50%, bubble study negative, LA normal size


- LDL 61.


- Cont. patient on ASA and plavix.


- Discussed with patient regarding smoking cessation.


- Cont. statin with LDL goal<70.


- Telemetry monitoring while in house


- PT/OT/ST


- Recommend for patient to have 30-day MCOT or ILR placement for cardiac 

monitoring, as infarct appears to be embolic in etiology, and Rt. ICA does not 

have significant stenosis. 


DVT Ppx: recommend lovenox





2.HTN:


- Recommend target BP of normotension as it has been >48 hours since symptoms 

onset.





Thank you for allowing me to participate in the care of this patient.


- Will sign off, as stroke workup has been completed, and further plan in place.

Please call with any questions.





Jose Unger MD


Neurology

## 2019-08-27 NOTE — PROGRESS NOTE
Assessment and Plan


Assessment and plan: 


Patient is a 65 yo [place] woman with a history of type 2 DM, hypertension, 

dyslipidemia, depression and CAD s/p recent PCI with KIRBY  On 8/23/19, following 

an abnormal stress test, she underwent coronary intervention. Coronary 

angiography revealed a 90% stenosis of the circumflex coronary artery for which 

she received a drug eluting stent.  There is also a  of a small mid RCA which

is being managed medically. LV systolic function was normal on LVG. Three days 

ago, she developed left upper extremity weakness with left-sided neglect and 

left visual field symptoms and slurred speech. 





* Carotid Ultrasound bilateral doppler Impression: Right ICA less than 50% 

  diameter stenosis, Left ICA 50-69% diameter stenosis


* CTA neck IMPRESSION: There is calcified plaque involving left carotid 

  bifurcation with approximately 70% stenosis by NASCET criteria. 2. This also 

  calcified plaque involving right carotid bifurcation with 30-40% stenosis at. 

  There is marked narrowing of the origin of the right external carotid artery. 

  3. There is mild narrowing of the origins of the vertebral arteries. 


* CT head without contrast IMPRESSION: 1. No definite acute findings. 2. Small 

  chronic-appearing cortical infarct in the left occipital lobe and small 

  chronic appearing lacune in the right caudate head. 


* CTA head IMPRESSION: There is atherosclerotic calcification involving the 

  distal internal carotid arteries with mild to moderate segmental narrowing as 

  described. 


* pCXR IMPRESSION: No acute abnormality of the chest. 


* TTE conclusions: mild MR, mild TR, lv size is normal, global LVSF is at the 

  lower limits of normal, inferior wall appears hypokinetic, EF 50%, RVSF normal





Left sided weakness and slurred speech is either TIA vs CVA: MRI brain pending, 

treat with antiplt and statin


Recent CAD s/p KIRBY: continue plavix


Tobacco dependency:  on stopping


Type 2 DM: accu checks and ssi, ada


Dyslipidemia: statin


PD: on Sinemet


Asthma wo excerb: albuterol inhaler


Left ICA stenosis: consult Vascular 





Disposition: continue inpatient care, await Vascular input on the 70% left ICA 

stenosis, MRI pending





History


Interval history: 


Patient was seen and examined. Follow-up on current diagnosis of slurred speech,

resolved. No overnight events reported to me. Patient denies any chest pain, 

shortness breath, nausea/vomiting or severe headaches. Imaging, nursing note, 

chart, labs and old chart reviewed. Discussed with patient. 








Hospitalist Physical





- Physical exam


Narrative exam: 


Gen: WDWN, NAD, Awake, Alert, Orientated 


HEENT: NCAT, EOMI, PERRL, OP Clear 


Neck: supple, no adenopathy, no thyromegaly, no JVD 


CVS/Heart: RRR, normal S1S2, pulses present bilaterally 


Chest/Lungs: CTA B, Symmetrical chest expansion, good air entry bilaterally 


GI/Abdomen: soft, NTND, good bowel sounds, no guarding or rebound 


/Bladder: no suprapubic tenderness, no CVA or paraspinal tenderness 


Extermity/Skin: no c/c/e, no obvious rash 


MSK: FROM x 4 


Neuro: CN 2-12 grossly intact, no new focal deficits 


Psych: calm 





- Constitutional


Vitals: 


                                        











Temp Pulse Resp BP Pulse Ox


 


 97.6 F   69   16   136/50   97 


 


 08/27/19 11:43  08/27/19 14:00  08/27/19 11:43  08/27/19 11:43  08/27/19 11:43











General appearance: Present: no acute distress





Results





- Labs


CBC & Chem 7: 


                                 08/27/19 05:14





                                 08/27/19 05:14


Labs: 


                             Laboratory Last Values











WBC  8.2 K/mm3 (4.5-11.0)   08/27/19  05:14    


 


RBC  3.77 M/mm3 (3.65-5.03)   08/27/19  05:14    


 


Hgb  11.8 gm/dl (10.1-14.3)   08/27/19  05:14    


 


Hct  34.8 % (30.3-42.9)   08/27/19  05:14    


 


MCV  92 fl (79-97)   08/27/19  05:14    


 


MCH  31 pg (28-32)   08/27/19  05:14    


 


MCHC  34 % (30-34)   08/27/19  05:14    


 


RDW  14.6 % (13.2-15.2)   08/27/19  05:14    


 


Plt Count  194 K/mm3 (140-440)   08/27/19  05:14    


 


Lymph % (Auto)  26.8 % (13.4-35.0)   08/27/19  05:14    


 


Mono % (Auto)  8.8 % (0.0-7.3)  H  08/27/19  05:14    


 


Eos % (Auto)  1.9 % (0.0-4.3)   08/27/19  05:14    


 


Baso % (Auto)  0.7 % (0.0-1.8)   08/27/19  05:14    


 


Lymph #  2.2 K/mm3 (1.2-5.4)   08/27/19  05:14    


 


Mono #  0.7 K/mm3 (0.0-0.8)   08/27/19  05:14    


 


Eos #  0.2 K/mm3 (0.0-0.4)   08/27/19  05:14    


 


Baso #  0.1 K/mm3 (0.0-0.1)   08/27/19  05:14    


 


Seg Neutrophils %  61.8 % (40.0-70.0)   08/27/19  05:14    


 


Seg Neutrophils #  5.1 K/mm3 (1.8-7.7)   08/27/19  05:14    


 


PT  14.1 Sec. (12.2-14.9)   08/26/19  11:31    


 


INR  1.12  (0.87-1.13)   08/26/19  11:31    


 


APTT  21.0 Sec. (24.2-36.6)  L  08/26/19  11:31    


 


  13.0 Sec. (15.1-19.6)  L  08/26/19  11:31    


 


Sodium  137 mmol/L (137-145)   08/27/19  05:14    


 


Potassium  4.0 mmol/L (3.6-5.0)   08/27/19  05:14    


 


Chloride  102.4 mmol/L ()   08/27/19  05:14    


 


Carbon Dioxide  23 mmol/L (22-30)   08/27/19  05:14    


 


  16 mmol/L  08/27/19  05:14    


 


BUN  13 mg/dL (7-17)   08/27/19  05:14    


 


  0.6 mg/dL (0.7-1.2)  L  08/27/19  05:14    


 


Estimated GFR  > 60 ml/min  08/27/19  05:14    


 


  22 %  08/27/19  05:14    


 


Glucose  225 mg/dL ()  H  08/27/19  05:14    


 


POC Glucose  194  ()  H  08/27/19  11:52    


 


  7.8 % (4-6)  H  08/26/19  21:19    


 


Calcium  8.5 mg/dL (8.4-10.2)   08/27/19  05:14    


 


  0.40 mg/dL (0.1-1.2)   08/27/19  05:14    


 


AST  17 units/L (5-40)   08/27/19  05:14    


 


ALT  16 units/L (7-56)   08/27/19  05:14    


 


  66 units/L ()   08/27/19  05:14    


 


  0.138 ng/mL (0.00-0.029)  H*  08/27/19  05:14    


 


  6.6 g/dL (6.3-8.2)   08/27/19  05:14    


 


  3.1 g/dL (3.9-5)  L  08/27/19  05:14    


 


  0.9 %  08/27/19  05:14    


 


Triglycerides  134 mg/dL (2-149)   08/27/19  05:14    


 


Cholesterol  101 mg/dL ()   08/27/19  05:14    


 


  61 mg/dL ()   08/27/19  05:14    


 


  26 mg/dL (40-59)  L  08/27/19  05:14    


 


  3.88 %  08/27/19  05:14    














Active Medications





- Current Medications


Current Medications: 














Generic Name Dose Route Start Last Admin





  Trade Name Freq  PRN Reason Stop Dose Admin


 


Acetaminophen  650 mg  08/26/19 20:03 





  Tylenol  PO  





  Q4H PRN  





  Pain MILD(1-3)/Fever >100.5/HA  


 


Albuterol  2.5 mg  08/26/19 20:14 





  Proventil  IH  





  Q4HRT PRN  





  Shortness Of Breath  


 


Aspirin  81 mg  08/26/19 21:00  08/27/19 12:44





  Baby Aspirin  PO   81 mg





  QDAY ROMI   Administration


 


Atorvastatin Calcium  40 mg  08/26/19 22:00  08/26/19 23:19





  Lipitor  PO   40 mg





  QHS ROMI   Administration


 


Dextrose  50 ml  08/27/19 09:30 





  D50w (25gm) Syringe  IV  





  PRN PRN  





  Hypoglycemia  


 


Escitalopram Oxalate  10 mg  08/26/19 21:00  08/27/19 12:44





  Lexapro  PO   10 mg





  DAILY ROMI   Administration


 


Famotidine  20 mg  08/26/19 22:00  08/27/19 12:43





  Pepcid  PO   20 mg





  BID ROMI   Administration


 


Glipizide  10 mg  08/26/19 22:00  08/27/19 08:00





  Glucotrol  PO   Not Given





  BIDDIAB [facility]  


 


Sodium Chloride  1,000 mls @ 75 mls/hr  08/26/19 21:00  08/26/19 23:19





  Nacl 0.9% 1000 Ml  IV   75 mls/hr





  AS DIRECT ROMI   Administration


 


Ibuprofen  600 mg  08/26/19 20:03 





  Ibuprofen  PO  





  Q6H PRN  





  Pain, Mild (1-3)  


 


Insulin Human Lispro  0 unit  08/27/19 11:30  08/27/19 11:30





  Humalog  SUB-Q   Not Given





  [facility]  





  Protocol  


 


Lisinopril  5 mg  08/26/19 21:00  08/27/19 12:43





  Zestril  PO   5 mg





  DAILY ROMI   Administration


 


Metformin HCl  850 mg  08/26/19 22:00  08/27/19 08:06





  Glucophage  PO   Not Given





  BIDDIAB [facility]  


 


Ondansetron HCl  4 mg  08/26/19 20:03 





  Zofran  IV  





  Q8H PRN  





  Nausea And Vomiting  


 


Sodium Chloride  10 ml  08/26/19 22:00  08/27/19 12:44





  Sodium Chloride Flush Syringe 10 Ml  IV   10 ml





  BID ROMI   Administration


 


Sodium Chloride  10 ml  08/26/19 20:03 





  Sodium Chloride Flush Syringe 10 Ml  IV  





  PRN PRN  





  LINE FLUSH

## 2019-08-27 NOTE — EVENT NOTE
Date: 08/27/19





Radiology Dr. Angel called me


MRI report


Nonhemorrhagic subacute infarction in the right basal ganglia and right inferior

parietal lobule

## 2019-08-27 NOTE — HISTORY AND PHYSICAL REPORT
CHIEF COMPLAINT:  COmes in for slurred speech and L sided weakness since last 5 
to 6 hours.Code stroke was called.

HISTORY OF PRESENT ILLNESS: 

 

COmes in for slurred speech and L sided weakness since last 5 to 6 hours.Code 
stroke was called.Her L sided weakness nearly resolved







PAST MEDICAL HISTORY:  Significant for hepatitis B, hypertension, type 2

diabetes and coronary artery disease, and hyperlipidemia.



PAST SURGICAL HISTORY:  Coronary stent.



SOCIAL HISTORY:  Smokes every day, pack a day.



FAMILY HISTORY:  Hypertension.



CURRENT MEDICATIONS:  On the chart.



PHYSICAL EXAMINATION:

GENERAL:  Young elderly female, cooperative during examination.

VITAL SIGNS:  Initial blood pressure was very high, 190/70. HR 88 T 98.6

HEENT:  Unremarkable.  The pupils are equal and reactive.

NECK:  Supple, no lymphadenopathy, no thyromegaly.

LUNGS:  Clear to auscultation and percussion.  Good air entry.

CARDIOVASCULAR:  S1, S2 heard.  No gallop, no murmur, no rub.  Apical impulse in

left fifth intercostal space in midclavicular line.

ABDOMEN:  Soft and benign.  No hepatosplenomegaly.  No guarding, no rigidity. 

Hernial orifices are normal.

EXTREMITIES:  Good power, 5/5 power in all 4 extremities.

CENTRAL NERVOUS SYSTEM:  Sensory system normal.  No cerebellar signs.



ASSESSMENT AND PLAN:

1. Acute CVA versus  Transient ischemic attack The patient had slurred speech 
which has

resolved.  CVA  workup including MRI/MRA brain, carotid

duplex scan and echocardiogram.

2.  Asthma.  Continue albuterol inhaler.

3.  Hyperlipidemia.  Continue Lipitor 40 mg at bedtime.

4.  Type 2 diabetes.  Continue Trulicity and Glucophage and glipizide.

5.  Parkinson's disease.  Continue Sinemet.

6.  Deep venous thrombosis prophylaxis, Lovenox and GI prophylaxis





DD: 08/27/2019

DT: 08/27/2019 

JOB# [id number]  [id number]

VSM/NTS

CECILLED

## 2019-08-27 NOTE — MAGNETIC RESONANCE REPORT
MRI BRAIN WITHOUT CONTRAST



INDICATION / CLINICAL INFORMATION:

stroke.



TECHNIQUE: 

Multiplanar, multisequence MR images of the brain were obtained.



COMPARISON: 

CT scan obtained yesterday



FINDINGS:



BRAIN / INTRACRANIAL CONTENTS: This is an abnormal MRI scan. Restrictive diffusion is seen in the rig
ht cerebral hemisphere in the inferior right basal ganglia, right globus pallidus, right frontal lobe
 border zone. Right lateral view. These areas of infarctions are more than 6 hours old (increased FLA
IR and T2 signal intensities) but less than 3 days old (low ADC values). I do not see free air/diffus
ion mismatch.



Isolated focus of subacute ischemia is seen in the left precentral gyrus.



Focal area of increased FLAIR signal intensity is seen along the superior frontal sulcus. Susceptibil
ity changes are seen. Platysma is no focal area of sulcal subarachnoid hemorrhage.



Wedge-shaped area of increased FLAIR and T2 signal intensities are seen in the internal and external 
border zone in the parieto-occipital area. This appears to be chronic.



Brainstem and cerebellar hemispheres are normal. 

 No acute hemorrhage, mass effect, midline shift, or hydrocephalus.  Scattered the deep hemispheric w
jose matter lesions are seen in the left cerebral hemisphere. No significant white matter abnormality
.



CRANIOCERVICAL JUNCTION: No significant abnormality.

VASCULAR FLOW-VOIDS: No significant abnormality.



ORBITS: No significant abnormality of visualized orbits.

SINUSES / MASTOIDS: No significant abnormality of visualized sinuses and mastoid air cells.



ADDITIONAL FINDINGS: None. 



IMPRESSION:

1. Nonhemorrhagic subacute infarction in the right basal ganglia, right inferior parietal lobule 



Signer Name: Cyndie Collins MD 

Signed: 8/27/2019 5:38 PM

 Workstation Name: [id number]

## 2019-08-27 NOTE — VASCULAR LAB REPORT
DUPLEX DOPPLER ULTRASOUND CAROTID, BILATERAL



INDICATION:

stroke.



FINDINGS:



RIGHT CAROTID: Small amount of atherosclerotic plaque.





Right ICA peak systolic velocity: 102 cm/sec.



Right Vertebral Artery: Antegrade flow.



LEFT CAROTID: Moderate calcified atherosclerotic plaque.



Left ICA peak systolic velocity: 132 cm/sec.





Left Vertebral Artery: Antegrade flow.



IMPRESSION:

1. Right Internal Carotid Artery: Less than 50% diameter stenosis.

2. Left Internal Carotid Artery: 50-69%  diameter stenosis.







Velocity criteria are extrapolated from diameter data as defined by the Society of Radiologists in Ul
trasound Consensus Conference, Radiology 2003; 229;340-346.



Degree of Stenosis (%) || ICA PSV (cm/sec) || Plaque estimate (%) || ICA/CCA PSV Ratio

           Normal                                <125                           None                 
                 <2.0  

             <50                                   <125                            <50               
                     <2.0

            50-69                               125-230                        50                    
               2.0-4.0

 70 but less than 100                  >230                            50                            
        >4.0

     Near occlusion                 High, low, or none            visible                            
     variable 

     Total occlusion                         None                    visible; no lumen               
        N/A

 



Signer Name: Cornel Lo MD 

Signed: 8/27/2019 11:16 AM

 Workstation Name: [id number]

## 2019-08-27 NOTE — CONSULTATION
History of Present Illness


Consult date: 08/27/19


Requesting physician: RAUL LEZAMA


Consult reason: elevated troponin


History of present illness: 


The patient is followed by Dr. Alcantara in our office. On 8/23/19, following an 

abnormal stress test, she underwent coronary intervention. Coronary angiography 

revealed a 90% stenosis of the circumflex coronary artery for which she received

a drug eluting stent.  There is also a  of a small mid RCA which is being 

managed medically. LV systolic function was normal on LVG. Three days ago, she 

developed left upper extremity weakness with left-sided neglect and left visual 

field symptoms.  CT scan did not reveal any acute findings other than occipital 

stroke.  However, CTA revealed 70% stenosis at the bifurcation of the left 

carotid artery.  MRI is currently pending. Although troponin level is mildly 

elevated, she has no cardiac symptoms.








Past History


Past Medical History: CAD, diabetes, hypertension, hyperlipidemia


Past Surgical History: PTCA, Other (Vscular surgery)


Social history: smoking


Family history: denies: CAD





Medications and Allergies


                                    Allergies











Allergy/AdvReac Type Severity Reaction Status Date / Time


 


No Known Allergies Allergy   Unverified 08/23/19 06:08











                                Home Medications











 Medication  Instructions  Recorded  Confirmed  Last Taken  Type


 


Albuterol Sulfate [Proventil Hfa] 6.7 gm IH PRN PRN 08/23/19 08/26/19 08/26/19 

History


 


AtorvaSTATin [Lipitor] 40 mg PO QHS 08/23/19 08/26/19 08/25/19 History


 


Escitalopram [Lexapro] 10 mg PO DAILY 08/23/19 08/26/19 08/25/19 History


 


Lisinopril [Zestril TAB] 5 mg PO DAILY 08/23/19 08/26/19 08/25/19 History


 


glipiZIDE [Glucotrol] 10 mg PO QID 08/23/19 08/26/19 08/25/19 History


 


metFORMIN [Glucophage] 850 mg PO BID 08/23/19 08/26/19 08/25/19 History


 


Aspirin [Aspirin BABY CHEW TAB] 81 mg PO QDAY 08/26/19 08/26/19 08/25/19 History











Active Meds: 


Active Medications





Acetaminophen (Tylenol)  650 mg PO Q4H PRN


   PRN Reason: Pain MILD(1-3)/Fever >100.5/HA


Albuterol (Proventil)  2.5 mg IH Q4HRT PRN


   PRN Reason: Shortness Of Breath


Aspirin (Baby Aspirin)  81 mg PO QDAY [facility]


   Last Admin: 08/26/19 23:19 Dose:  81 mg


   Documented by: 


Atorvastatin Calcium (Lipitor)  40 mg PO QHS [facility]


   Last Admin: 08/26/19 23:19 Dose:  40 mg


   Documented by: 


Dextrose (D50w (25gm) Syringe)  50 ml IV PRN PRN


   PRN Reason: Hypoglycemia


Escitalopram Oxalate (Lexapro)  10 mg PO DAILY [facility]


   Last Admin: 08/26/19 23:48 Dose:  10 mg


   Documented by: 


Famotidine (Pepcid)  20 mg PO BID [facility]


   Last Admin: 08/26/19 23:19 Dose:  20 mg


   Documented by: 


Glipizide (Glucotrol)  10 mg PO BIDDIAB [facility]


   Last Admin: 08/26/19 23:48 Dose:  10 mg


   Documented by: 


Sodium Chloride (Nacl 0.9% 1000 Ml)  1,000 mls @ 75 mls/hr IV AS DIRECT [facility]


   Last Admin: 08/26/19 23:19 Dose:  75 mls/hr


   Documented by: 


Ibuprofen (Ibuprofen)  600 mg PO Q6H PRN


   PRN Reason: Pain, Mild (1-3)


Insulin Human Lispro (Humalog)  0 unit SUB-Q ACHS [facility]; Protocol


Lisinopril (Zestril)  5 mg PO DAILY [facility]


   Last Admin: 08/26/19 23:49 Dose:  Not Given


   Documented by: 


Metformin HCl (Glucophage)  850 mg PO BIDDIAB [facility]


   Last Admin: 08/26/19 23:49 Dose:  Not Given


   Documented by: 


Ondansetron HCl (Zofran)  4 mg IV Q8H PRN


   PRN Reason: Nausea And Vomiting


Sodium Chloride (Sodium Chloride Flush Syringe 10 Ml)  10 ml IV BID [facility]


   Last Admin: 08/26/19 23:19 Dose:  10 ml


   Documented by: 


Sodium Chloride (Sodium Chloride Flush Syringe 10 Ml)  10 ml IV PRN PRN


   PRN Reason: LINE FLUSH











Review of Systems


Constitutional: no fever, no chills


Ears, nose, mouth and throat: no ear pain, no ear discharge, no sore throat


Cardiovascular: no chest pain, no palpitations, no lightheadedness, no shortness

of breath


Respiratory: no cough, no hemoptysis


Gastrointestinal: no abdominal pain, no nausea, no vomiting, no diarrhea, no 

constipation


Genitourinary Female: no dysuria, no urinary frequency


Rectal: no pain, no bleeding


Musculoskeletal: no neck stiffness, no neck pain


Integumentary: no rash, no pruritis


Neurological: weakness, change in speech, no numbness, no headaches


Endocrine: no cold intolerance, no heat intolerance


Hematologic/Lymphatic: no easy bruising, no easy bleeding


Allergic/Immunologic: no urticaria, no wheezing





Physical Examination


                                   Vital Signs





                                Last Vital Signs











Temp  97.6 F   08/27/19 11:43


 


Pulse  69   08/27/19 11:43


 


Resp  16   08/27/19 11:43


 


BP  136/50   08/27/19 11:43


 


Pulse Ox  97   08/27/19 11:43














General appearance: no acute distress


HEENT: Positive: EOMI, Normocephaly, Mucus Membranes Moist


Neck: Positive: neck supple, trachea midline


Cardiac: Positive: Reg Rate and Rhythm, S1/S2


Lungs: Positive: clear to auscultation


Neuro: Positive: Grossly Intact


Abdomen: Positive: Soft, Active Bowel Sounds.  Negative: Tender


Skin: Positive: Clear.  Negative: Rash


Musculoskeletal: Normal Range of Motion


Extremities: Present: normal.  Absent: edema





Results





                                 08/27/19 05:14





                                 08/27/19 05:14


                                 Cardiac Enzymes











  08/27/19 Range/Units





  05:14 


 


AST  17  (5-40)  units/L








                                   Coagulation











  08/26/19 Range/Units





  11:31 


 


PT  14.1  (12.2-14.9)  Sec.


 


INR  1.12  (0.87-1.13)  


 


APTT  21.0 L  (24.2-36.6)  Sec.








                                     Lipids











  08/27/19 Range/Units





  05:14 


 


Triglycerides  134  (2-149)  mg/dL


 


Cholesterol  101  ()  mg/dL


 


HDL Cholesterol  26 L  (40-59)  mg/dL


 


Cholesterol/HDL Ratio  3.88  %








                                       CBC











  08/26/19 08/27/19 Range/Units





  11:31 05:14 


 


WBC  8.4  8.2  (4.5-11.0)  K/mm3


 


RBC  3.95  3.77  (3.65-5.03)  M/mm3


 


Hgb  12.1  11.8  (10.1-14.3)  gm/dl


 


Hct  36.1  34.8  (30.3-42.9)  %


 


Plt Count  211  194  (140-440)  K/mm3


 


Lymph #  2.3  2.2  (1.2-5.4)  K/mm3


 


Mono #  0.6  0.7  (0.0-0.8)  K/mm3


 


Eos #  0.1  0.2  (0.0-0.4)  K/mm3


 


Baso #  0.1  0.1  (0.0-0.1)  K/mm3








                          Comprehensive Metabolic Panel











  08/26/19 08/27/19 Range/Units





  11:31 05:14 


 


Sodium  135 L  137  (137-145)  mmol/L


 


Potassium  4.3  4.0  (3.6-5.0)  mmol/L


 


Chloride  99.6  102.4  ()  mmol/L


 


Carbon Dioxide  22  23  (22-30)  mmol/L


 


BUN  16  13  (7-17)  mg/dL


 


Creatinine  0.8  D  0.6 L  (0.7-1.2)  mg/dL


 


Glucose  379 H  225 H  ()  mg/dL


 


Calcium  9.0  8.5  (8.4-10.2)  mg/dL


 


AST   17  (5-40)  units/L


 


ALT   16  (7-56)  units/L


 


Alkaline Phosphatase   66  ()  units/L


 


Total Protein   6.6  (6.3-8.2)  g/dL


 


Albumin   3.1 L  (3.9-5)  g/dL














EKG interpretations





- Telemetry


EKG Rhythm: Sinus Rhythm





Assessment and Plan


Cardiac status appears stable. Elevated Tn is likely residual from recent PCI. 

Obtain Echo.








- Patient Problems


(1) Acute CVA (cerebrovascular accident)


Current Visit: Yes   Status: Acute   





(2) Stenosis of left internal carotid artery


Current Visit: Yes   Status: Acute   





(3) Stented coronary artery


Current Visit: Yes   Status: Chronic   





(4) CAD (coronary artery disease)


Current Visit: Yes   Status: Chronic   


Qualifiers: 


   Coronary Disease-Associated Artery/Lesion type: native artery 





(5) Elevated troponin


Current Visit: Yes   Status: Acute   





(6) Hypertension


Current Visit: Yes   Status: Chronic   


Qualifiers: 


   Hypertension type: essential hypertension   Qualified Code(s): I10 - Es

sential (primary) hypertension   





(7) Diabetes mellitus


Current Visit: Yes   Status: Chronic   


Qualifiers: 


   Diabetes mellitus type: type 2

## 2019-08-28 VITALS — SYSTOLIC BLOOD PRESSURE: 124 MMHG | DIASTOLIC BLOOD PRESSURE: 29 MMHG

## 2019-08-28 RX ADMIN — CLOPIDOGREL BISULFATE SCH MG: 75 TABLET ORAL at 09:00

## 2019-08-28 RX ADMIN — LISINOPRIL SCH MG: 5 TABLET ORAL at 09:00

## 2019-08-28 RX ADMIN — GLIPIZIDE SCH MG: 10 TABLET ORAL at 09:00

## 2019-08-28 RX ADMIN — ESCITALOPRAM OXALATE SCH MG: 10 TABLET ORAL at 09:01

## 2019-08-28 RX ADMIN — FAMOTIDINE SCH MG: 20 TABLET ORAL at 09:00

## 2019-08-28 RX ADMIN — GLIPIZIDE SCH MG: 10 TABLET ORAL at 16:35

## 2019-08-28 RX ADMIN — METFORMIN HYDROCHLORIDE SCH MG: 850 TABLET ORAL at 08:59

## 2019-08-28 RX ADMIN — METFORMIN HYDROCHLORIDE SCH MG: 850 TABLET ORAL at 16:35

## 2019-08-28 RX ADMIN — Medication SCH ML: at 09:00

## 2019-08-28 NOTE — MAGNETIC RESONANCE REPORT
MRA head without contrast.



INDICATION:

Recent stroke.



TECHNIQUE:

3-D time-of-flight MRA head without contrast



COMPARISON:

CTA head on 8/26/2019.



FINDINGS:

There is mild nonflow limiting stenosis in the bilateral supraclinoid internal carotid arteries of ab
out 20-30% by criteria similar to NASCET. There is no flow limiting stenosis or large vessel occlusio
n in the intracranial arteries. There are no significant anatomical variations. No aneurysms are iden
tified.



IMPRESSION:

1. No flow limiting stenosis or large vessel occlusion in the intracranial arteries. 



Signer Name: Huy Hernández MD 

Signed: 8/28/2019 11:30 AM

 Workstation Name: [id number]

## 2019-08-28 NOTE — CONSULTATION
History of Present Illness





- Reason for Consult


Consult date: 08/28/19


Carotid Stenosis 





- History of Present Illness





HPI: 65yo female hospitalized after TIA x2 on Sunday with symptoms of facial 

numbness, difficulty speaking and difficulty walking.  The patient states each 

episode lasted a few minutes and completely resolved.  She presented to [facility] on Sunday but was discharged home after work-up in the ED.  The patient 

recently underwent PCI with stent placement this past Friday and was instructed 

to come to [facility].  The patient denies any further episodes since Sunday.  The pat

moni denies previous stoke but has smoke 1 ppd for over 40 years.  Patient with 

a hx of bilateral lower extremity open revascularization.  





PE: 


NAD, A&Ox3


RRR


non-labored respirations


CN II-XII intact


motor/sensory grossly intact





Carotid duplex reviewed


Neck CTA reviewed


Brain MRI reviewed


Plan:


Patient with significant stenosis noted in the left carotid


Patient with acute CVA


Unlikely CVA related to carotid stenosis given distribution


would avoid surgical intervention of asymptomatic lesion in the setting of acute

CVA and recent PCI 


patient should continue with medical management and plan to follow-up in our 

clinic with repeat carotid duplex in 6 months 








Past History


Past Medical History: CAD, diabetes, hypertension, hyperlipidemia


Past Surgical History: PTCA, Other (Vscular surgery)


Social history: lives with family, smoking


Family history: denies: CAD





Medications and Allergies


                                    Allergies











Allergy/AdvReac Type Severity Reaction Status Date / Time


 


No Known Allergies Allergy   Unverified 08/23/19 06:08











                                Home Medications











 Medication  Instructions  Recorded  Confirmed  Last Taken  Type


 


Albuterol Sulfate [Proventil Hfa] 6.7 gm IH PRN PRN 08/23/19 08/26/19 08/26/19 

History


 


AtorvaSTATin [Lipitor] 40 mg PO QHS 08/23/19 08/26/19 08/25/19 History


 


Escitalopram [Lexapro] 10 mg PO DAILY 08/23/19 08/26/19 08/25/19 History


 


Lisinopril [Zestril TAB] 5 mg PO DAILY 08/23/19 08/26/19 08/25/19 History


 


glipiZIDE [Glucotrol] 10 mg PO QID 08/23/19 08/26/19 08/25/19 History


 


metFORMIN [Glucophage] 850 mg PO BID 08/23/19 08/26/19 08/25/19 History


 


Aspirin [Aspirin BABY CHEW TAB] 81 mg PO QDAY 08/26/19 08/26/19 08/25/19 History











Active Meds: 


Active Medications





Acetaminophen (Tylenol)  650 mg PO Q4H PRN


   PRN Reason: Pain MILD(1-3)/Fever >100.5/HA


Albuterol (Proventil)  2.5 mg IH Q4HRT PRN


   PRN Reason: Shortness Of Breath


Aspirin (Baby Aspirin)  81 mg PO QDAY [facility]


Atorvastatin Calcium (Lipitor)  40 mg PO QHS [facility]


   Last Admin: 08/27/19 22:43 Dose:  40 mg


   Documented by: 


Clopidogrel Bisulfate (Plavix)  75 mg PO QDAY [facility]


   Last Admin: 08/27/19 16:44 Dose:  75 mg


   Documented by: 


Dextrose (D50w (25gm) Syringe)  50 ml IV PRN PRN


   PRN Reason: Hypoglycemia


Escitalopram Oxalate (Lexapro)  10 mg PO DAILY [facility]


   Last Admin: 08/27/19 12:44 Dose:  10 mg


   Documented by: 


Famotidine (Pepcid)  20 mg PO BID [facility]


   Last Admin: 08/27/19 22:43 Dose:  20 mg


   Documented by: 


Glipizide (Glucotrol)  10 mg PO BIDDIAB [facility]


   Last Admin: 08/27/19 17:57 Dose:  10 mg


   Documented by: 


Insulin Human Lispro (Humalog)  0 unit SUB-Q ACHS [facility]; Protocol


   Last Admin: 08/27/19 22:43 Dose:  Not Given


   Documented by: 


Lisinopril (Zestril)  5 mg PO DAILY [facility]


   Last Admin: 08/27/19 12:43 Dose:  5 mg


   Documented by: 


Metformin HCl (Glucophage)  850 mg PO BIDDIAB [facility]


   Last Admin: 08/27/19 17:58 Dose:  Not Given


   Documented by: 


Ondansetron HCl (Zofran)  4 mg IV Q8H PRN


   PRN Reason: Nausea And Vomiting


Sodium Chloride (Sodium Chloride Flush Syringe 10 Ml)  10 ml IV BID [facility]


   Last Admin: 08/27/19 22:43 Dose:  10 ml


   Documented by: 


Sodium Chloride (Sodium Chloride Flush Syringe 10 Ml)  10 ml IV PRN PRN


   PRN Reason: LINE FLUSH











Exam





- Constitutional


Vitals: 


                                        











Temp Pulse Resp BP Pulse Ox


 


 97.4 F L  64   20   122/35   99 


 


 08/28/19 08:00  08/28/19 08:00  08/28/19 08:00  08/28/19 08:00  08/28/19 08:00














Results





- Labs


CBC & Chem 7: 


                                 08/27/19 05:14





                                 08/27/19 05:14


Labs: 


                              Abnormal lab results











  08/27/19 08/27/19 08/27/19 Range/Units





  11:52 17:24 21:27 


 


POC Glucose  194 H  253 H  264 H  ()  














  08/28/19 Range/Units





  07:38 


 


POC Glucose  199 H  ()

## 2019-08-28 NOTE — PROGRESS NOTE
Assessment and Plan


tte reviewed. Cardiac status appears stable. Elevated Tn is likely residual from

recent PCI. vascular recs noted. 


Nothing further to add from cardiac perspective at this time. Pt may d/c home 

from cardiology standpoint on home cardiac regimen. 


Recommend follow up in our office with Dr. Alcantara within 1-2 weeks of hospital 

discharge ([phone]). 





The patient has been seen in conjunction with Dr. Ervin who agrees with the 

assessment and plan of care.





- Patient Problems


(1) Acute CVA (cerebrovascular accident)


Current Visit: Yes   Status: Acute   





(2) Stenosis of left internal carotid artery


Current Visit: Yes   Status: Acute   





(3) Stented coronary artery


Current Visit: Yes   Status: Chronic   





(4) CAD (coronary artery disease)


Current Visit: Yes   Status: Chronic   


Qualifiers: 


   Coronary Disease-Associated Artery/Lesion type: native artery 





(5) Elevated troponin


Current Visit: Yes   Status: Acute   





(6) Hypertension


Current Visit: Yes   Status: Chronic   


Qualifiers: 


   Hypertension type: essential hypertension   Qualified Code(s): I10 - [place]

tial (primary) hypertension   





(7) Diabetes mellitus


Current Visit: Yes   Status: Chronic   


Qualifiers: 


   Diabetes mellitus type: type 2 








Subjective


Date of service: 08/28/19


Principal diagnosis: acute CVA; carotid stenosis


Interval history: 


pt resting in bed, no current cardiac complaints. in SR HR 60s on tele with some

SB overnight, HR low of 49bpm.








Objective





                                Last Vital Signs











Temp  97.4 F L  08/28/19 08:00


 


Pulse  64   08/28/19 08:00


 


Resp  20   08/28/19 08:00


 


BP  122/35   08/28/19 08:00


 


Pulse Ox  99   08/28/19 08:00

















- Physical Examination


General: No Apparent Distress


HEENT: Positive: EOMI, Normocephaly, Mucus Membranes Moist


Neck: Positive: neck supple, trachea midline


Cardiac: Positive: Reg Rate and Rhythm, S1/S2


Lungs: Positive: clear to auscultation


Neuro: Positive: Grossly Intact


Abdomen: Positive: Soft, Active Bowel Sounds.  Negative: Tender


Skin: Positive: Clear.  Negative: Rash


Musculoskeletal: Normal Range of Motion


Extremities: Present: normal.  Absent: edema

## 2019-08-28 NOTE — DISCHARGE SUMMARY
Providers





- Providers


Date of Admission: 


08/26/19 18:08





Date of discharge: 08/28/19


Attending physician: 


JI BEAUCHAMP





                                        





08/26/19 13:44


Speech Therapy Evaluation and Treat [CONS] Routine 


   Reason For Exam: failed swallow screen





08/26/19 20:03


Consult to Physician [CONS] Routine 


   Comment: 


   Consulting Provider: ALLIE GONZALEZ


   Physician Instructions: 


   Reason For Exam: CVA





08/26/19 20:09


Occupational Therapy Evaluate and Treat [CONS] Routine 


   Comment: 


   Reason For Exam: Neuro deficits


Physical Therapy Evaluation and Treat [CONS] Routine 


   Comment: 


   Reason For Exam: Neuro deficits





08/26/19 23:29


Consult to Physician [CONS] Routine 


   Comment: 


   Consulting Provider: BRYANT RIOS


   Physician Instructions: 


   Reason For Exam: elevated troponin





08/27/19 14:37


Consult to Physician [CONS] Routine 


   Comment: 


   Consulting Provider: AVTAR CANTU


   Physician Instructions: 


   Reason For Exam: Left ICA stenosis, TIA/CVA














Hospitalization


Condition: Stable


Hospital course: 


Patient is a 63 yo  woman with a history of type 2 DM, hypertension, 

dyslipidemia, depression and CAD s/p recent PCI with KIRBY On 8/23/19, following 

an abnormal stress test, she underwent coronary intervention. Coronary 

angiography revealed a 90% stenosis of the circumflex coronary artery for which 

she received a drug eluting stent.  There is also a  of a small mid RCA which

 is being managed medically. LV systolic function was normal on LVG. Three days 

prior to arrival, she developed left upper extremity weakness with left-sided 

neglect and left visual field symptoms and slurred speech. 





* Carotid Ultrasound bilateral doppler Impression: Right ICA less than 50% 

  diameter stenosis, Left ICA 50-69% diameter stenosis


* CTA neck IMPRESSION: There is calcified plaque involving left carotid 

  bifurcation with approximately 70% stenosis by NASCET criteria. 2. This also 

  calcified plaque involving right carotid bifurcation with 30-40% stenosis at. 

  There is marked narrowing of the origin of the right external carotid artery. 

  3. There is mild narrowing of the origins of the vertebral arteries. 


* CT head without contrast IMPRESSION: 1. No definite acute findings. 2. Small 

  chronic-appearing cortical infarct in the left occipital lobe and small 

  chronic appearing lacune in the right caudate head. 


* CTA head IMPRESSION: There is atherosclerotic calcification involving the 

  distal internal carotid arteries with mild to moderate segmental narrowing as 

  described. 


* pCXR IMPRESSION: No acute abnormality of the chest. 


* TTE conclusions: mild MR, mild TR, lv size is normal, global LVSF is at the 

  lower limits of normal, inferior wall appears hypokinetic, EF 50%, RVSF normal


* MRA head without contrast IMPRESSION: 1. No flow limiting stenosis or large 

  vessel occlusion in the intracranial arteries. 


* MRI head without contrast IMPRESSION: 1. Nonhemorrhagic subacute infarction in

   the right basal ganglia, right inferior parietal lobule 





Discharge Diagnoses:


Acute right ischemic stroke with residual Left sided visual field neglect and 

dysarthria: treated with antiplt and statin


Recent CAD s/p KIRBY: continue plavix


Tobacco dependency:  on stopping


Type 2 DM: accu checks and ssi, ada


Dyslipidemia: statin


PD: on Sinemet


Asthma wo excerb: albuterol inhaler


Left ICA stenosis: consulted Vascular, input noted; "Unlikely CVA related to 

carotid stenosis given distribution, would avoid surgical intervention of 

asymptomatic lesion in the setting of acute CVA and recent PCI, patient should 

continue with medical management and plan to follow-up in our clinic with repeat

 carotid duplex in 6 months" per Dr. Dugan





Disposition: DC/TX-06 HOME UNDER HOME [facility]


Time spent for discharge: 36 minutes





Core Measure Documentation





- Palliative Care


Palliative Care/ Comfort Measures: Not Applicable





- Core Measures


Any of the following diagnoses?: stroke





- VTE Discharge Requirements


Deep Vein Thrombosis/Pulmonary Embolism Present on Admission: No


Has pt received <5 days of overlap therapy or INR<2.0: No


Anticoagulant overlap therapy prescribed at discharge: No


Contraindication No Overlap Therapy order at DC: Not Indicated





- Stroke Discharge Requirements


Statin for LDL = or >70 mg/dl on DC: Yes


Anticoag for atrial fib/atrial flutter: Not Applicable


Reason for no anticoag for AF/F on DC: Not Indicated


Antithrombotic for ischemic stroke: Yes





Exam





- Physical Exam


Narrative exam: 


Gen: WDWN, NAD, Awake, Alert, Orientated 


HEENT: NCAT, EOMI, PERRL, OP Clear 


Neck: supple, no adenopathy, no thyromegaly, no JVD 


CVS/Heart: RRR, normal S1S2, pulses present bilaterally 


Chest/Lungs: CTA B, Symmetrical chest expansion, good air entry bilaterally 


GI/Abdomen: soft, NTND, good bowel sounds, no guarding or rebound 


/Bladder: no suprapubic tenderness, no CVA or paraspinal tenderness 


Extermity/Skin: no c/c/e, no obvious rash 


MSK: FROM x 4 


Neuro: CN 2-12 grossly intact, no new focal deficits 


Psych: calm 





- Constitutional


Vitals: 


                                        











Temp Pulse Resp BP Pulse Ox


 


 98.3 F   72   18   124/29   99 


 


 08/28/19 15:50  08/28/19 15:50  08/28/19 15:50  08/28/19 15:50  08/28/19 15:50














Plan


Activity: no driving until cleared by PCP, other (no strenous activity unless 

cleared by PCP)


Diet: low salt, diabetic


Special Instructions: record daily BP diary, record blood sugar diary (three 

times a day and at bedtime, keep log book and take to PCP)


Additional Instructions: --NO DRIVING unless cleared by Neurologist.  --see Dr. Ilan Menon to set up 30 day Holter monitor.  --see Dr. Avtar Cantu, need 6 month 

Ultrasound of Carotids imaging study


Follow up with: 


AL SILVA [Other] - 7 Days


AVTAR CANTU MD [Staff Physician] - 6 Weeks


LINDA LEDEZMA MD [Staff Physician] - 7 Days


ILAN MENON MD [Staff Physician] - 10 Days


Prescriptions: 


AtorvaSTATin [Lipitor] 80 mg PO QHS #30 tab


Aspirin [Aspirin BABY CHEW TAB] 81 mg PO QDAY #30 tab.chew